# Patient Record
Sex: FEMALE
[De-identification: names, ages, dates, MRNs, and addresses within clinical notes are randomized per-mention and may not be internally consistent; named-entity substitution may affect disease eponyms.]

---

## 2017-12-13 NOTE — MAMMOGRAPHY REPORT
BILATERAL DIGITAL SCREENING MAMMOGRAM TOMOSYNTHESIS WITH CAD: 12/13/2017

CLINICAL HISTORY: Routine screening.  Patient has no complaints.  





TECHNIQUE:  Breast tomosynthesis in addition to standard 2D mammography was performed. Current study 
was also evaluated with a Computer Aided Detection (CAD) system.  



COMPARISON: Comparison is made to exams dated:  11/15/2016 mammogram, 11/3/2015 mammogram, and 10/30/
2014 mammogram - [facility].   



BREAST COMPOSITION:  The tissue of both breasts is extremely dense, which lowers the sensitivity of m
ammography.  



FINDINGS: The glandular pattern is similar to prior mammograms.  No suspicious spiculated or irregula
r mass, architectural distortion or cluster of new, suspicious microcalcifications is seen.  



IMPRESSION:  ACR BI-RADS CATEGORY 1: NEGATIVE

There is no mammographic evidence of malignancy. A 1 year screening mammogram is recommended.  The pa
tient will receive written notification of the results.  





Approximately 10% of breast cancers are not detected with mammography. A negative mammographic report
 should not delay biopsy if a clinically suggestive mass is present.



Natalia Flaherty M.D.          

ay/:12/13/2017 13:22:05  



Imaging Technologist: Katherine Espinosa, [facility]

letter sent: Normal 1/2  

BI-RADS Code: ACR BI-RADS Category 1: Negative

## 2018-03-22 ENCOUNTER — HOSPITAL ENCOUNTER (OUTPATIENT)
Dept: HOSPITAL 45 - X.SURG | Age: 43
Discharge: HOME | End: 2018-03-22
Attending: OBSTETRICS & GYNECOLOGY
Payer: COMMERCIAL

## 2018-03-22 VITALS — OXYGEN SATURATION: 99 % | SYSTOLIC BLOOD PRESSURE: 120 MMHG | DIASTOLIC BLOOD PRESSURE: 81 MMHG | HEART RATE: 65 BPM

## 2018-03-22 VITALS
WEIGHT: 152.32 LBS | BODY MASS INDEX: 23.91 KG/M2 | BODY MASS INDEX: 23.91 KG/M2 | HEIGHT: 67.01 IN | WEIGHT: 152.32 LBS | HEIGHT: 67.01 IN

## 2018-03-22 VITALS — TEMPERATURE: 97.34 F

## 2018-03-22 DIAGNOSIS — Z82.3: ICD-10-CM

## 2018-03-22 DIAGNOSIS — N92.0: ICD-10-CM

## 2018-03-22 DIAGNOSIS — G62.9: ICD-10-CM

## 2018-03-22 DIAGNOSIS — M54.12: ICD-10-CM

## 2018-03-22 DIAGNOSIS — K21.9: ICD-10-CM

## 2018-03-22 DIAGNOSIS — N84.0: Primary | ICD-10-CM

## 2018-03-22 DIAGNOSIS — Z80.49: ICD-10-CM

## 2018-03-22 DIAGNOSIS — F32.9: ICD-10-CM

## 2018-03-22 DIAGNOSIS — Z87.891: ICD-10-CM

## 2018-03-22 NOTE — [facility] POST OPERATIVE BRIEF NOTE
Immediate Operative Summary


Operative Date


Mar 22, 2018.





Pre-Operative Diagnosis





Endometrial mass





Irregular menstrual cycle





Post-Operative Diagnosis





same as preop





Procedure(s) Performed





Dilatation And Curettage, Hysteroscopy, Endometrial Polypectomy, Endometrial 


Ablation





Surgeon


Dr. Andujar





Assistant Surgeon(s)


none





Estimated Blood Loss


0ml





Findings


Consistent with Post-Op Diagnosis





Fluids (cc crystalloids)


700cc, 220cc deficit





Specimens





A: endometrial polyp





B: endometrial curettings





Drains


None





Anesthesia Type


General





Complication(s)


none





Disposition


Accompanied Pt To Recovery:  no


Disposition:  Recovery Room / PACU

## 2018-03-22 NOTE — ANESTHESIA PROGRESS NT - [facility]
Anesthesia Post Op Note


Date & Time


Mar 22, 2018 at 07:55





Vital Signs


Pain Intensity:  0





Vital Signs Past 12 Hours








  Date Time  Temp Pulse Resp B/P (MAP) Pulse Ox O2 Delivery O2 Flow Rate FiO2


 


3/22/18 07:43 36.8 68 12 101/72 100 Mask 7 


 


3/22/18 06:32 36.1 70 20 126/90 (102) 100 Room Air  











Notes


Mental Status:  alert / awake / arousable, participated in evaluation


Pt Amnestic to Procedure:  Yes


Nausea / Vomiting:  adequately controlled


Pain:  adequately controlled


Airway Patency, RR, SpO2:  stable & adequate


BP & HR:  stable & adequate


Hydration State:  stable & adequate


Anesthetic Complications:  no major complications apparent

## 2018-03-22 NOTE — OPERATIVE REPORT
DATE OF OPERATION:  2018

 

PREOPERATIVE DIAGNOSES:

1.  Endometrial mass.

2.  Menorrhagia.

 

POSTOPERATIVE DIAGNOSES:  Same.

 

PROCEDURES:

1.  D&C hysteroscopy.

2.  MyoSure removal of endometrial mass.

3.  NovaSure endometrial ablation.

 

SURGEON:  Mariela Andujar MD.

 

ANESTHESIA:  General per laryngeal mask.

 

ESTIMATED BLOOD LOSS:  None.

 

INDICATIONS:  The patient  is a 42-year-old  1, para 1 who has heavy

periods and during the workup, was found to have an endometrial mass.

 

FINDINGS:  There was a small flat polypoid endometrial mass in the upper

anterior uterine wall.  Otherwise, the lining appeared normal.  Both tubal

ostia were visualized and appeared normal.  Uterus sounded to 8 cm, cervical

length 4 cm, cavity length 4 cm, cavity width 3.7 cm.  Power 81 sarah.  Time

of procedure 1.1 minute and 24 seconds.  Charred endometrium noted throughout

the end of the procedure.

 

COMPLICATIONS:  None.

 

FLUIDS:  700 mL of IV fluids and a 220 mL deficit.

 

URINE OUTPUT:  Clear yellow urine drained from the bladder at the end of the

procedure.

 

DRAINS:  None.

 

DISPOSITION:  To recovery room in stable condition.

 

DESCRIPTION OF THE PROCEDURE:  The patient was taken to the operating room

where she was identified verbally and by bracelet.  She was placed in dorsal

supine position where general anesthesia was induced without difficulty.  She

was then placed in dorsal lithotomy position in candy-cane stirrups and

prepped and draped in normal sterile fashion.  Timeout was held identifying

correct patient, procedure and positioning.

 

An exam under anesthesia revealed a small anteverted uterus that was mobile. 

There were no adnexal masses noted.  The bladder was drained of urine.  A

weighted speculum was placed to posterior vagina.  The anterior lip of the

cervix was grasped with a single tooth tenaculum.  Uterus sounded to 8 cm. 

It was dilated to #23 Paula dilator.  The MyoSure scope was introduced into

the uterus with the above noted findings.  The MyoSure was then introduced

and the polyp was removed.  The scope was removed and a curettage was then

done in 365 degrees.  The NovaSure endometrial ablation device was then

called for and was inserted into the cavity.  The cavity integrity test was

adequate and with a power of 31 sarah, the procedure was performed for 1

minute and 24 seconds.  The NovaSure endometrial ablation device was removed.

 The hysteroscope was replaced into the uterus and global endometrial

ablation was noted.  The scope was removed and the procedure was terminated

after the instruments were removed from the vagina.  All sponge, lap and

needle counts were correct x2.  The patient tolerated the procedure well and

was taken to recovery room in stable condition.

 

 

I attest to the content of the Intraoperative Record and any orders documented therein. Any exception
s are noted below.

## 2018-03-22 NOTE — DISCHARGE INSTRUCTIONS
Discharge Instructions


Date of Service


Mar 22, 2018.





Visit


Reason for Visit:  Endometrial Mass, Irregular Menstrual Cycle





Discharge


Discharge Diagnosis / Problem:  s/p D&C/ hysteroscopy, removal of mass, ablation





Discharge Goals


Goal(s):  Specific goals





Medications


Stopped Medications Name(s):  


last time daily meds-3/21





Activity Recommendations


Activity Limitations:  per Instructions/Follow-up section





Anesthesia


.





Post Anesthesia Instructions:





If you have had General Anesthesia or IV Sedation:





*  Do not drive today.


*  Resume driving when surgeon permits.


*  Do not make important decisions or sign legal documents today.


*  Call surgeon for:





   1.  Temperature elevations greater than 101 degrees F.


   2.  Uncontrollable pain.


   3.  Excessive bleeding.


   4.  Persistent nausea and vomiting.


   5.  Medication intolerance (nausea, vomiting or rash).





*  For nausea and vomiting use only clear liquids such as: tea, soda, bouillon 

until nausea subsides, then gradually increase diet as tolerated.





*  If you have any concerns or questions, call your surgeon's office.  If 

physician is unavailable and it is an emergency, call 911 or go to the nearest 

emergency room.





.





Instructions / Follow-Up


Instructions / Follow-Up


ACTIVITY RECOMMENDATIONS:





*  Avoid tampons, douching, hot tubs, pools, and intercourse until bleeding has 

stopped.


*  May shower as usual.


*  No strenuous activity for 24-48 hours.  After 24-48 hours, you may do 

anything you feel


    like doing (driving and sports are okay).








SPECIAL CARE INSTRUCTIONS:





Special Diet:





*  Mild nausea may occur in the immediate post-operative period.  


*  Take clear liquids such as tea, cola or bouillon until all nausea has 

subsided; you may


    then resume your normal diet.





Special Care:





*  Light bleeding and vaginal spotting can last from a few days to 3-4 weeks.


   Call your doctor if bleeding becomes heavier than the heaviest part of your 

period.


*  Check your temperature twice a day for one week.  If it goes above 100.4 

degrees Fahrenheit


   (38.0 Celsius), notify your doctor.





*  Call your doctor's office for an appointment for 6 weeks after your surgery. 








FOLLOW-UP VISIT:





Call your doctor's office for an appointment for 6 weeks after your surgery.





Diet Recommendations


Recommended Home Diet:  no limitations, resume previous diet





Procedures


Procedures Performed:  


Dilatation And Curettage, Hysteroscopy, Endometrial Polypectomy, Endometrial 


Ablation





Pending Studies


Studies pending at discharge:  no





Medical Emergencies








.


Who to Call and When:





Medical Emergencies:  If at any time you feel your situation is an emergency, 

please call 911 immediately.





.





Non-Emergent Contact


Non-Emergency issues call your:  Gynecologist





.


.








"Provider Documentation" section prepared by Mariela Andujar.








.